# Patient Record
(demographics unavailable — no encounter records)

---

## 2025-03-25 NOTE — HISTORY OF PRESENT ILLNESS
[FreeTextEntry1] : Anjelica presents for follow up wiht a h/o urinary urgency, nocturia, atrophy and LS. She was started on treatment with clobetasol and vaginal estrogen. Today she reports significant improvement in symptoms. She is currently using the clobetasol prn. She denies lesions or ulcerations. She is using the vaginal estrogen as prescribed.   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

## 2025-03-25 NOTE — DISCUSSION/SUMMARY
[FreeTextEntry1] : -Exam findings reviewed, no lesions visualized. -LS: we reviewed the chronicity of condition and associated risks including SCC. We discussed signs of concern, will notify me immediately if these occur. Continue Clobetasol 0.05% ointment as needed -Atrophy: discussed her estradiol cream use, continue biw -For urinary symptoms, continue behavioral and fluid modifications.   RTO in 6 mo for follow up and surveillance of LS. Will RTO for evaluation and assurance of no LS lesions. Is she develops lesions will notify my office immediately and come in for biopsy

## 2025-03-25 NOTE — PHYSICAL EXAM
[Chaperone Present] : A chaperone was present in the examining room during all aspects of the physical examination [Vulvar Atrophy] : vulvar atrophy [Labia Majora] : were normal [Normal] : was normal [FreeTextEntry1] : General: Well, appearing. Alert and orientated. No acute distress HEENT: Normocephalic, atraumatic and extraocular muscles appear to be intact  Neck: Full range of motion, no obvious lymphadenopathy, deformities, or masses noted  Respiratory: Speaking in full sentences comfortably, normal work of breathing and no cough during visit Musculoskeletal: active full range of motion in extremities  Extremities: No upper extremity edema noted Skin: no obvious rash or skin lesions Neuro: Orientated X 3, speech is fluent, normal rate Psych: Normal mood and affect    [de-identified] :  lichenification of vulva significantly improved; no plaques, lesions or ulcerations